# Patient Record
Sex: MALE | Race: WHITE | Employment: STUDENT | URBAN - METROPOLITAN AREA
[De-identification: names, ages, dates, MRNs, and addresses within clinical notes are randomized per-mention and may not be internally consistent; named-entity substitution may affect disease eponyms.]

---

## 2017-03-10 ENCOUNTER — APPOINTMENT (OUTPATIENT)
Dept: GENERAL RADIOLOGY | Facility: HOSPITAL | Age: 32
End: 2017-03-10
Attending: PHYSICIAN ASSISTANT
Payer: COMMERCIAL

## 2017-03-10 ENCOUNTER — OFFICE VISIT (OUTPATIENT)
Dept: SURGERY | Facility: CLINIC | Age: 32
End: 2017-03-10

## 2017-03-10 ENCOUNTER — HOSPITAL ENCOUNTER (EMERGENCY)
Facility: HOSPITAL | Age: 32
Discharge: HOME | End: 2017-03-10
Attending: PHYSICIAN ASSISTANT
Payer: COMMERCIAL

## 2017-03-10 VITALS
TEMPERATURE: 97 F | DIASTOLIC BLOOD PRESSURE: 92 MMHG | HEIGHT: 70 IN | BODY MASS INDEX: 25.05 KG/M2 | WEIGHT: 175 LBS | HEART RATE: 62 BPM | SYSTOLIC BLOOD PRESSURE: 127 MMHG

## 2017-03-10 VITALS
DIASTOLIC BLOOD PRESSURE: 92 MMHG | HEIGHT: 70 IN | OXYGEN SATURATION: 99 % | BODY MASS INDEX: 25.05 KG/M2 | WEIGHT: 175 LBS | SYSTOLIC BLOOD PRESSURE: 127 MMHG | TEMPERATURE: 97 F | RESPIRATION RATE: 16 BRPM | HEART RATE: 62 BPM

## 2017-03-10 DIAGNOSIS — S09.90XA CLOSED HEAD INJURY WITHOUT LOSS OF CONSCIOUSNESS, INITIAL ENCOUNTER: Primary | ICD-10-CM

## 2017-03-10 DIAGNOSIS — S02.2XXA CLOSED FRACTURE OF NASAL BONE, INITIAL ENCOUNTER: ICD-10-CM

## 2017-03-10 DIAGNOSIS — S01.111A EYEBROW LACERATION, RIGHT, INITIAL ENCOUNTER: ICD-10-CM

## 2017-03-10 DIAGNOSIS — S50.12XA CONTUSION OF LEFT FOREARM, INITIAL ENCOUNTER: ICD-10-CM

## 2017-03-10 DIAGNOSIS — S01.81XD LACERATION OF BROW WITHOUT COMPLICATION, SUBSEQUENT ENCOUNTER: Primary | ICD-10-CM

## 2017-03-10 PROCEDURE — 21310 HC CLOSED TX NASAL BONE FX WITHOUT MANIPULATION: CPT

## 2017-03-10 PROCEDURE — 70160 X-RAY EXAM OF NASAL BONES: CPT

## 2017-03-10 PROCEDURE — 99285 EMERGENCY DEPT VISIT HI MDM: CPT

## 2017-03-10 PROCEDURE — 90471 IMMUNIZATION ADMIN: CPT

## 2017-03-10 RX ORDER — IBUPROFEN 600 MG/1
600 TABLET ORAL ONCE
Status: COMPLETED | OUTPATIENT
Start: 2017-03-10 | End: 2017-03-10

## 2017-03-10 NOTE — ED INITIAL ASSESSMENT (HPI)
While at home sts accidentally tripped and fell into a wall, sustained laceration to the right eyebrow area, had bruising to nose and forehead area. Denies any loc, does c/o left arm pain but has good range of motion.   Occurred today prior to arrival.

## 2017-03-10 NOTE — ED NOTES
Pt reports falling into a wall this am, laceration noted to right eyebrow, left nose abrasion, mid forehead abrasion  Bleeding controlled, wound care completed by [facility]  Denies LOC, denies nausea or vomiting

## 2017-03-10 NOTE — CONSULTS
History of Present Illness: The patient is a 27year old male referred by the [facility] emergency department with a brow laceration. The patient relates that he sustained a fall at home earlier today after tripping. His face struck a panel wall.   He susana vision, double vision, cataracts, glaucoma, nasal congestion, nosebleed, hoarseness, sore throat, or swollen glands    Respiratory:   The patient denies shortness of breath, cough, bloody cough, phlegm, asthma, or wheezing    Cardiovascular:   The patient d patterns and movements are normal, and affect is appropriate. HEENT: The head is normocephalic. The neck is supple. The thyroid is not enlarged and is without palpable masses. The trachea is in the midline. Conjunctiva are clear, non-icteric.   Pupils a

## 2017-03-15 ENCOUNTER — OFFICE VISIT (OUTPATIENT)
Dept: SURGERY | Facility: CLINIC | Age: 32
End: 2017-03-15

## 2017-03-15 VITALS
TEMPERATURE: 97 F | BODY MASS INDEX: 25.68 KG/M2 | WEIGHT: 179.38 LBS | SYSTOLIC BLOOD PRESSURE: 121 MMHG | DIASTOLIC BLOOD PRESSURE: 84 MMHG | HEIGHT: 70 IN | HEART RATE: 69 BPM

## 2017-03-15 DIAGNOSIS — S01.111D EYEBROW LACERATION, RIGHT, SUBSEQUENT ENCOUNTER: Primary | ICD-10-CM

## 2017-03-15 PROBLEM — S01.119A EYEBROW LACERATION: Status: ACTIVE | Noted: 2017-03-15

## 2017-03-15 PROCEDURE — 99024 POSTOP FOLLOW-UP VISIT: CPT | Performed by: PHYSICIAN ASSISTANT

## 2017-03-15 NOTE — PROGRESS NOTES
This is a 27year old male that is here for a wound check and suture removal after his brow laceration and closure 5 days ago. He denies any fevers or signs of infection. He denies any problems with the laceration site. He has been applying Neosporin.

## 2017-04-07 ENCOUNTER — OFFICE VISIT (OUTPATIENT)
Dept: SURGERY | Facility: CLINIC | Age: 32
End: 2017-04-07

## 2017-04-07 VITALS — WEIGHT: 177.81 LBS | BODY MASS INDEX: 25.45 KG/M2 | HEIGHT: 70 IN

## 2017-04-07 DIAGNOSIS — S01.111D EYEBROW LACERATION, RIGHT, SUBSEQUENT ENCOUNTER: Primary | ICD-10-CM

## 2017-04-07 PROCEDURE — 99024 POSTOP FOLLOW-UP VISIT: CPT | Performed by: SURGERY

## (undated) NOTE — ED AVS SNAPSHOT
[facility] Emergency Department    [address]     [address] [place] [address]    Phone:  [phone]    Fax:  [phone]           Maverick Baird   MRN: [id number]    Department:  [facility] Emergency Department   Date of Visit:  3/10 You have not been prescribed any medications.               Discharge Instructions       Go directly to Dr. Coni Bingham office after leaving emergency department  Return to emergency department for any new or worsening symptoms      Discharge References/Attach discretion of that Physician.   If you need additional assistance selecting a physician, you may call the [facility] Physician Referral and Class Registration line at [phone] or find a doctor online by visiting [email or web address].    IF THE Additional Information       We are concerned for your overall well being:    - If you are a smoker or have smoked in the last 12 months, we encourage you to explore options for quitting.     - If you have concerns related to behavioral health issues or th

## (undated) NOTE — ED AVS SNAPSHOT
[facility] Emergency Department    [address]     [address] [place] [address]    Phone:  [phone]    Fax:  [phone]           Allan Aguirre   MRN: [id number]    Department:  [facility] Emergency Department   Date of Visit:  3/10 and Class Registration line at [phone] or find a doctor online by visiting [email or web address].    IF THERE IS ANY CHANGE OR WORSENING OF YOUR CONDITION, CALL YOUR PRIMARY CARE PHYSICIAN AT ONCE OR RETURN IMMEDIATELY TO [address] [place] [address].     If

## (undated) NOTE — MR AVS SNAPSHOT
[facility] [address]  [phone]                    After Visit Summary   3/15/2017    Jb Carney    MRN: [id number]           Visit Information        Provider Department Dept Phone    3/15/

## (undated) NOTE — MR AVS SNAPSHOT
[facility] [place]  [address], [address] [facility]  [phone]                    After Visit Summary   4/7/2017    Guillermina Major    MRN: [id number]           Visit Information        Provider Department Dept Phone    4/7/2

## (undated) NOTE — MR AVS SNAPSHOT
[facility] [place]  [address]  [address], [address] [facility]  [phone]                    After Visit Summary   3/10/2017    Maverick Baird    MRN: [id number]           Visit Information        Provider Department Dept Phone    3/10